# Patient Record
Sex: FEMALE
[De-identification: names, ages, dates, MRNs, and addresses within clinical notes are randomized per-mention and may not be internally consistent; named-entity substitution may affect disease eponyms.]

---
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
## 2020-03-17 ENCOUNTER — HOSPITAL ENCOUNTER (EMERGENCY)
Dept: HOSPITAL 5 - ED | Age: 20
Discharge: HOME | End: 2020-03-17
Payer: SELF-PAY

## 2020-03-17 VITALS — SYSTOLIC BLOOD PRESSURE: 126 MMHG | DIASTOLIC BLOOD PRESSURE: 77 MMHG

## 2020-03-17 DIAGNOSIS — J40: Primary | ICD-10-CM

## 2020-03-17 DIAGNOSIS — J02.9: ICD-10-CM

## 2020-03-17 DIAGNOSIS — Z79.899: ICD-10-CM

## 2020-03-17 PROCEDURE — 99282 EMERGENCY DEPT VISIT SF MDM: CPT

## 2020-03-17 NOTE — EMERGENCY DEPARTMENT REPORT
Upper Respiratory HPI





- HPI


Chief Complaint: Upper Respiratory Infection


Stated Complaint: SOB/COLD/SORE THROAT


Time Seen by Provider: 03/17/20 13:20


Duration: 1 week


URI Symptoms: Rhinorrhea: Yes, Sore Throat: Yes, Ear Pain: Yes, Cough: No, 

Shortness of Breath: No, Sick Contacts: No, Unable to Take Fluids: No, Urine 

Output Abnormal: No, Listless Behavior: No


Other History: This is a 20-year-old female nontoxic well in appearnce with no 

signs of distress presents with sore throat, runny nose, dry nonproductive cough

x1 week.  Patient denies any chest pain, shortness of breathe, fever, chills, 

nausea, vomiting, headache, stiff neck, abdominal pain, numbness or tingling.  

Patient denies any recent travels, long car rides, or recent hospital stays.  

Denies any allergies or significant PMH.





- Home Meds and Allergies


Home Medications: 


                                  Previous Rx's











 Medication  Instructions  Recorded  Last Taken  Type


 


Azithromycin [Zithromax Z-OMAR] 250 mg PO DAILY #6 tablet 03/17/20 Unknown Rx














ED Review of Systems


ROS: 


Stated complaint: SOB/COLD/SORE THROAT


Other details as noted in HPI





Constitutional: denies: chills, fever


Eyes: denies: eye pain, eye discharge, vision change


ENT: throat pain, congestion.  denies: ear pain


Respiratory: cough.  denies: shortness of breath, wheezing


Cardiovascular: denies: chest pain, palpitations


Endocrine: no symptoms reported


Gastrointestinal: denies: abdominal pain, nausea, diarrhea


Genitourinary: denies: urgency, dysuria, discharge


Musculoskeletal: denies: back pain, joint swelling, arthralgia


Skin: denies: rash, lesions


Neurological: denies: headache, weakness, paresthesias


Psychiatric: denies: anxiety, depression


Hematological/Lymphatic: denies: easy bleeding, easy bruising





ED Past Medical Hx





- Past Medical History


Previous Medical History?: Yes


Hx Asthma: Yes





- Surgical History


Past Surgical History?: No





- Medications


Home Medications: 


                                Home Medications











 Medication  Instructions  Recorded  Confirmed  Last Taken  Type


 


Azithromycin [Zithromax Z-OMAR] 250 mg PO DAILY #6 tablet 03/17/20  Unknown Rx














ED Bronchiolitis Physical Exam





- Exam


General: 


Vital signs noted. No distress. Alert and acting appropriately.





HEENT: Yes Pharyngeal Erythema, No Conjuctival Injection, No Dry Mucous 

Membranes, No Rhinorrhea


Ear: Neither TM Bulge, Neither TM Erythema, Neither EAC Discharge


Neck: No Adenopathy, No Rigidity


Lungs: Yes Clear Lung Sounds, Yes Good Air Exchange, Yes Cough (dry), No 

Wheezes, No Stridor, No Nasal Flaring, No Retractions, No Use of Accessory 

Muscles


Heart: Yes Regular, No Murmur


Abdomen: Yes Normal Bowel Sounds, No Tenderness, No Peritoneal Signs


Skin: No Rash, No Eczema


Neurologic: 


Alert and oriented, no deficits.








Musculoskeletal: 


Unremarkable.











ED Physical Exam





- General


Limitations: No Limitations





ED Course


                                   Vital Signs











  03/17/20





  13:01


 


Temperature 98.1 F


 


Pulse Rate 75


 


Respiratory 18





Rate 


 


Blood Pressure 126/77


 


O2 Sat by Pulse 97





Oximetry 














- Reevaluation(s)


Reevaluation #1: 





03/17/20 13:26


Patient is speaking in full sentences with no signs of distress noted.





ED Medical Decision Making





- Medical Decision Making





20-year-old female that presents with pharyngitis and bronchitis like symptoms. 

Patient is stable and was examined by me.  Will treat patient with zpak.  Vital 

signs are stable.  Patient was instructed to Follow-up with a primary care 

doctor in 3-5 days or if symptoms worsen and continue return to emergency room 

as soon as possible.  At time of discharge, the patient does not seem toxic or 

ill in appearance.  No acute signs of distress noted.  Patient agrees to 

discharge treatment plan of care.  No further questions noted by the patient.





Critical care attestation.: 


If time is entered above; I have spent that time in minutes in the direct care 

of this critically ill patient, excluding procedure time.








ED Disposition


Clinical Impression: 


 Bronchitis





Pharyngitis


Qualifiers:


 Pharyngitis/tonsillitis etiology: unspecified etiology Qualified Code(s): J02.9

- Acute pharyngitis, unspecified





Disposition: DC-01 TO HOME OR SELFCARE


Is pt being admited?: No


Does the pt Need Aspirin: No


Condition: Stable


Instructions:  Acute Bronchitis (ED), Pharyngitis (ED)


Additional Instructions: 


Follow-up with a primary care doctor in 3-5 days or if symptoms worsen and 

continue return to emergency room as soon as possible.  


Prescriptions: 


Azithromycin [Zithromax Z-OMAR] 250 mg PO DAILY #6 tablet


Referrals: 


PRIMARY CARE,MD [Referring] - 3-5 Days


FIDELINA GARCES MD [Staff Physician] - 3-5 Days


[facility] [Outside] - 3-5 Days


Forms:  Work/School Release Form(ED)